# Patient Record
Sex: MALE | Race: WHITE | NOT HISPANIC OR LATINO | Employment: UNEMPLOYED | URBAN - METROPOLITAN AREA
[De-identification: names, ages, dates, MRNs, and addresses within clinical notes are randomized per-mention and may not be internally consistent; named-entity substitution may affect disease eponyms.]

---

## 2018-01-30 ENCOUNTER — TELEPHONE (OUTPATIENT)
Dept: INTERNAL MEDICINE | Facility: CLINIC | Age: 23
End: 2018-01-30

## 2018-01-30 NOTE — TELEPHONE ENCOUNTER
----- Message from Guillermo Mandel sent at 1/26/2018  9:24 AM CST -----  Contact: self [phone]  Patient is calling to make a new appointment with MD . Please advise, Thanks

## 2018-01-31 NOTE — TELEPHONE ENCOUNTER
Spoke with patient I made him aware of your response regarding the type of insurance your requesting, he was not been referred by another physician , but states his mother Mine Lemus is your patient.